# Patient Record
Sex: FEMALE | Race: WHITE
[De-identification: names, ages, dates, MRNs, and addresses within clinical notes are randomized per-mention and may not be internally consistent; named-entity substitution may affect disease eponyms.]

---

## 2019-11-25 ENCOUNTER — HOSPITAL ENCOUNTER (EMERGENCY)
Dept: HOSPITAL 25 - UCEAST | Age: 19
Discharge: HOME | End: 2019-11-25
Payer: COMMERCIAL

## 2019-11-25 VITALS — SYSTOLIC BLOOD PRESSURE: 143 MMHG | DIASTOLIC BLOOD PRESSURE: 89 MMHG

## 2019-11-25 DIAGNOSIS — J02.9: ICD-10-CM

## 2019-11-25 DIAGNOSIS — H92.01: Primary | ICD-10-CM

## 2019-11-25 PROCEDURE — G0463 HOSPITAL OUTPT CLINIC VISIT: HCPCS

## 2019-11-25 PROCEDURE — 99202 OFFICE O/P NEW SF 15 MIN: CPT

## 2019-12-25 NOTE — UC
Pediatric ENT HPI





- HPI Summary


HPI Summary: 





Right ear pain. sore throat x 24 hours





- History Of Current Complaint


Chief Complaint: UCGeneralIllness


Stated Complaint: EAR ACHE AND SORE THROAT


Time Seen by Provider: 11/25/19 18:43


Hx Obtained From: Family/Caretaker


Pain Intensity: 0


Pain Scale Used: 0-10 Numeric


Aggravating Factor(s): Nothing


Alleviating Factor(s): Nothing





- Risk Factor(s)


Epiglottis Risk Factors: Negative





- Allergies/Home Medications


Allergies/Adverse Reactions: 


 Allergies











Allergy/AdvReac Type Severity Reaction Status Date / Time


 


No Known Allergies Allergy   Verified 11/25/19 18:49














Past Medical History


Respiratory History: Yes: Hx Asthma - exercise induced





Review Of Systems


All Other Systems Reviewed And Are Negative: Yes


Constitutional: Negative: Fever, Chills


ENT: Positive: Ear Pain.  Negative: Throat Pain


Respiratory: Negative: Cough


Skin: Negative: Rash


Neurological: Negative: Lethargy





Physical Exam


Triage Information Reviewed: Yes


Vital Signs: 


 Initial Vital Signs











Temp  97.2 F   11/25/19 18:51


 


Pulse  102   11/25/19 18:51


 


Resp  16   11/25/19 18:51


 


BP  143/89   11/25/19 18:51


 


Pulse Ox  96   11/25/19 18:51











Vital Signs Reviewed: Yes


Appearance: Well-Appearing


ENT: Positive: Hearing grossly normal, Pharynx normal, TMs normal - left, TM 

bulging - partially R, TM dull - R, TM red - R, Uvula midline


Neck: Positive: Supple, No Lymphadenopathy.  Negative: Nuchal Rigidity


Respiratory: Positive: Lungs clear


Cardiovascular: Positive: Normal





Pediatric EENT Course/Dx





- Course


Course Of Treatment: 





R OM on exam w/ one day of R ear pain.  Afebrile but will tx.  Discussed side 

effects of meds. vitals good.





- Differential Dx/Diagnosis


Differential Diagnosis/HQI/PQRI: Cerumen Impaction, Foreign Body, Otitis Media, 

Otitis Externa, URI


Provider Diagnosis: 


 Ear pain








Discharge ED





- Sign-Out/Discharge


Documenting (check all that apply): Patient Departure


All imaging exams completed and their final reports reviewed: No Studies





- Discharge Plan


Condition: Good


Disposition: HOME


Prescriptions: 


Amoxicillin/Clavulanate TAB* [Augmentin *] 875 mg PO BID 7 Days #14 tab


Patient Education Materials:  Ear Infection (ED)


Referrals: 


No Primary Care Phys,NOPCP [Primary Care Provider] - 


Additional Instructions: 


Please take the antibiotic for the full amount of days.





- Billing Disposition and Condition


Condition: GOOD


Disposition: Home





- Attestation Statements


Provider Attestation: 





Per institutional requirements, I have reviewed the chart, however, I was not 

consulted specifically or made aware of this patient by the  midlevel provider.

  I did not personally evaluate, interact with , or disposition  this patient.